# Patient Record
Sex: FEMALE | Race: WHITE
[De-identification: names, ages, dates, MRNs, and addresses within clinical notes are randomized per-mention and may not be internally consistent; named-entity substitution may affect disease eponyms.]

---

## 2019-02-21 ENCOUNTER — HOSPITAL ENCOUNTER (EMERGENCY)
Dept: HOSPITAL 38 - CC.ED | Age: 49
Discharge: HOME | End: 2019-02-21
Payer: COMMERCIAL

## 2019-02-21 DIAGNOSIS — M25.561: Primary | ICD-10-CM
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
## 2024-10-25 ENCOUNTER — HOSPITAL ENCOUNTER (EMERGENCY)
Dept: HOSPITAL 38 - CC.ED | Age: 54
Discharge: HOME | End: 2024-10-25
Payer: COMMERCIAL

## 2024-10-25 VITALS — SYSTOLIC BLOOD PRESSURE: 148 MMHG | DIASTOLIC BLOOD PRESSURE: 87 MMHG | HEART RATE: 112 BPM

## 2024-10-25 DIAGNOSIS — M54.16: Primary | ICD-10-CM

## 2024-10-25 DIAGNOSIS — Z90.49: ICD-10-CM

## 2024-10-25 DIAGNOSIS — Z79.899: ICD-10-CM

## 2024-10-25 RX ADMIN — ORPHENADRINE CITRATE ONE MG: 60 INJECTION INTRAMUSCULAR; INTRAVENOUS at 21:41

## 2024-10-25 RX ADMIN — CYCLOBENZAPRINE HYDROCHLORIDE ONE PACKET: 10 TABLET, FILM COATED ORAL at 22:02

## 2024-10-25 RX ADMIN — METHYLPREDNISOLONE SODIUM SUCCINATE ONE MG: 40 INJECTION, POWDER, FOR SOLUTION INTRAMUSCULAR; INTRAVENOUS at 21:41
